# Patient Record
Sex: MALE | Race: WHITE | Employment: OTHER | URBAN - METROPOLITAN AREA
[De-identification: names, ages, dates, MRNs, and addresses within clinical notes are randomized per-mention and may not be internally consistent; named-entity substitution may affect disease eponyms.]

---

## 2018-02-27 PROBLEM — R40.0 DAYTIME SLEEPINESS: Status: ACTIVE | Noted: 2018-02-27

## 2018-02-27 PROBLEM — R06.83 SNORING: Status: ACTIVE | Noted: 2018-02-27

## 2021-12-14 ENCOUNTER — APPOINTMENT (OUTPATIENT)
Dept: ULTRASOUND IMAGING | Age: 66
End: 2021-12-14
Attending: EMERGENCY MEDICINE
Payer: MEDICARE

## 2021-12-14 ENCOUNTER — HOSPITAL ENCOUNTER (OUTPATIENT)
Facility: HOSPITAL | Age: 66
Setting detail: OBSERVATION
Discharge: HOME | End: 2021-12-17
Attending: EMERGENCY MEDICINE | Admitting: HOSPITALIST
Payer: MEDICARE

## 2021-12-14 DIAGNOSIS — L73.9 FOLLICULITIS: Primary | ICD-10-CM

## 2021-12-14 DIAGNOSIS — L03.119 CELLULITIS OF LOWER EXTREMITY, UNSPECIFIED LATERALITY: ICD-10-CM

## 2021-12-14 DIAGNOSIS — R60.0 LEG EDEMA: ICD-10-CM

## 2021-12-14 DIAGNOSIS — I82.419 ACUTE DEEP VEIN THROMBOSIS (DVT) OF FEMORAL VEIN, UNSPECIFIED LATERALITY (HCC): ICD-10-CM

## 2021-12-14 PROCEDURE — 93970 EXTREMITY STUDY: CPT | Performed by: EMERGENCY MEDICINE

## 2021-12-14 PROCEDURE — 99220 INITIAL OBSERVATION CARE,LEVL III: CPT | Performed by: INTERNAL MEDICINE

## 2021-12-14 RX ORDER — CEPHALEXIN 500 MG/1
500 CAPSULE ORAL 4 TIMES DAILY
Qty: 40 CAPSULE | Refills: 0 | Status: SHIPPED | OUTPATIENT
Start: 2021-12-14 | End: 2021-12-17

## 2021-12-14 RX ORDER — HEPARIN SODIUM AND DEXTROSE 10000; 5 [USP'U]/100ML; G/100ML
18 INJECTION INTRAVENOUS ONCE
Status: COMPLETED | OUTPATIENT
Start: 2021-12-14 | End: 2021-12-15

## 2021-12-14 RX ORDER — HEPARIN SODIUM 5000 [USP'U]/ML
80 INJECTION INTRAVENOUS; SUBCUTANEOUS ONCE
Status: COMPLETED | OUTPATIENT
Start: 2021-12-14 | End: 2021-12-14

## 2021-12-15 ENCOUNTER — APPOINTMENT (OUTPATIENT)
Dept: GENERAL RADIOLOGY | Facility: HOSPITAL | Age: 66
End: 2021-12-15
Attending: INTERNAL MEDICINE
Payer: MEDICARE

## 2021-12-15 PROBLEM — I82.419: Status: ACTIVE | Noted: 2021-12-15

## 2021-12-15 PROBLEM — R60.0 LEG EDEMA: Status: ACTIVE | Noted: 2021-12-15

## 2021-12-15 PROBLEM — L03.119 CELLULITIS OF LOWER EXTREMITY, UNSPECIFIED LATERALITY: Status: ACTIVE | Noted: 2021-12-15

## 2021-12-15 PROCEDURE — 99204 OFFICE O/P NEW MOD 45 MIN: CPT | Performed by: INTERNAL MEDICINE

## 2021-12-15 PROCEDURE — 74018 RADEX ABDOMEN 1 VIEW: CPT | Performed by: INTERNAL MEDICINE

## 2021-12-15 RX ORDER — DOCUSATE SODIUM 100 MG/1
100 CAPSULE, LIQUID FILLED ORAL DAILY
Status: DISCONTINUED | OUTPATIENT
Start: 2021-12-15 | End: 2021-12-17

## 2021-12-15 RX ORDER — LISINOPRIL AND HYDROCHLOROTHIAZIDE 12.5; 1 MG/1; MG/1
1 TABLET ORAL DAILY
Status: DISCONTINUED | OUTPATIENT
Start: 2021-12-15 | End: 2021-12-15 | Stop reason: SDUPTHER

## 2021-12-15 RX ORDER — MELATONIN
3 NIGHTLY PRN
Status: DISCONTINUED | OUTPATIENT
Start: 2021-12-15 | End: 2021-12-17

## 2021-12-15 RX ORDER — ACETAMINOPHEN 500 MG
500 TABLET ORAL EVERY 6 HOURS PRN
Status: DISCONTINUED | OUTPATIENT
Start: 2021-12-15 | End: 2021-12-17

## 2021-12-15 RX ORDER — BISACODYL 10 MG
10 SUPPOSITORY, RECTAL RECTAL
Status: DISCONTINUED | OUTPATIENT
Start: 2021-12-15 | End: 2021-12-17

## 2021-12-15 RX ORDER — ACETAMINOPHEN 325 MG/1
650 TABLET ORAL EVERY 6 HOURS PRN
Status: DISCONTINUED | OUTPATIENT
Start: 2021-12-15 | End: 2021-12-17

## 2021-12-15 RX ORDER — FLUTICASONE PROPIONATE 50 MCG
1 SPRAY, SUSPENSION (ML) NASAL DAILY
Status: DISCONTINUED | OUTPATIENT
Start: 2021-12-15 | End: 2021-12-17

## 2021-12-15 RX ORDER — CALCIUM POLYCARBOPHIL 625 MG 625 MG/1
625 TABLET ORAL EVERY OTHER DAY
Status: DISCONTINUED | OUTPATIENT
Start: 2021-12-15 | End: 2021-12-17

## 2021-12-15 RX ORDER — RISPERIDONE 0.25 MG/1
1 TABLET, FILM COATED ORAL 2 TIMES DAILY
Status: DISCONTINUED | OUTPATIENT
Start: 2021-12-15 | End: 2021-12-17

## 2021-12-15 RX ORDER — CETIRIZINE HYDROCHLORIDE 5 MG/1
5 TABLET ORAL DAILY
Status: DISCONTINUED | OUTPATIENT
Start: 2021-12-15 | End: 2021-12-17

## 2021-12-15 RX ORDER — PRAVASTATIN SODIUM 10 MG
10 TABLET ORAL NIGHTLY
Status: DISCONTINUED | OUTPATIENT
Start: 2021-12-15 | End: 2021-12-17

## 2021-12-15 RX ORDER — SENNOSIDES 8.6 MG
17.2 TABLET ORAL NIGHTLY PRN
Status: DISCONTINUED | OUTPATIENT
Start: 2021-12-15 | End: 2021-12-17

## 2021-12-15 RX ORDER — LORAZEPAM 1 MG/1
1 TABLET ORAL DAILY
Status: DISCONTINUED | OUTPATIENT
Start: 2021-12-15 | End: 2021-12-17

## 2021-12-15 RX ORDER — HEPARIN SODIUM AND DEXTROSE 10000; 5 [USP'U]/100ML; G/100ML
INJECTION INTRAVENOUS CONTINUOUS
Status: DISCONTINUED | OUTPATIENT
Start: 2021-12-15 | End: 2021-12-17

## 2021-12-15 RX ORDER — SODIUM PHOSPHATE, DIBASIC AND SODIUM PHOSPHATE, MONOBASIC 7; 19 G/133ML; G/133ML
1 ENEMA RECTAL ONCE AS NEEDED
Status: DISCONTINUED | OUTPATIENT
Start: 2021-12-15 | End: 2021-12-17

## 2021-12-15 RX ORDER — FAMOTIDINE 20 MG/1
20 TABLET ORAL DAILY
Status: DISCONTINUED | OUTPATIENT
Start: 2021-12-15 | End: 2021-12-17

## 2021-12-15 RX ORDER — AMLODIPINE BESYLATE 5 MG/1
5 TABLET ORAL DAILY
Status: DISCONTINUED | OUTPATIENT
Start: 2021-12-15 | End: 2021-12-17

## 2021-12-15 RX ORDER — TAMSULOSIN HYDROCHLORIDE 0.4 MG/1
0.4 CAPSULE ORAL DAILY
Status: DISCONTINUED | OUTPATIENT
Start: 2021-12-15 | End: 2021-12-17

## 2021-12-15 RX ORDER — ONDANSETRON 2 MG/ML
4 INJECTION INTRAMUSCULAR; INTRAVENOUS EVERY 6 HOURS PRN
Status: DISCONTINUED | OUTPATIENT
Start: 2021-12-15 | End: 2021-12-17

## 2021-12-15 RX ORDER — POLYETHYLENE GLYCOL 3350 17 G/17G
17 POWDER, FOR SOLUTION ORAL DAILY PRN
Status: DISCONTINUED | OUTPATIENT
Start: 2021-12-15 | End: 2021-12-17

## 2021-12-15 NOTE — ED QUICK NOTES
Orders for admission, patient is aware of plan and ready to go upstairs. Any questions, please call ED RN Torin Chacon at extension [phone]     Vaccinated? yes  Type of COVID test sent:rapid  COVID Suspicion level: Low      Titratable drug(s) infusing:Heparin  Rate:22

## 2021-12-15 NOTE — CM/SW NOTE
From [facility], MSW spoke to pt's TERENCE Farnsworth at the group home. If pt needs a ride home they would like staff to arrange a medicar. MSW called Pt's guardian Marialuisa-dago stout.

## 2021-12-15 NOTE — ED INITIAL ASSESSMENT (HPI)
Pt from [facility] to ED with staff member. Brought to ED for bilat swelling and redness worse today. Denies fevers, denies injury. Per staff member patient has frequent swelling to bilat lower extremities but redness is main concern.

## 2021-12-15 NOTE — SLP NOTE
ADULT SWALLOWING EVALUATION    ASSESSMENT    ASSESSMENT/OVERALL IMPRESSION:  Order received for bedside swallow valuation to r/o aspiration. Pt presented with acute on chronic BLLE swelling.  Pmhx includes developmental delay, HTN, HLD, psychiatric disorder Liquids                        Compensatory Strategies Recommended: Slow rate; Alternate consistencies;Small bites and sips  Aspiration Precautions: Upright position; Slow rate;Small bites and sips  Medication Administration Recommendations: Whole in puree swallows  Patient Positioning: Upright;Midline (pt lying down in bed; HOB elevated to 90 degrees)    Oral Phase of Swallow: Impaired  Bolus Retrieval: Intact  Bilabial Seal: Intact  Bolus Formation: Impaired  Bolus Propulsion: Impaired  Mastication: Impair

## 2021-12-15 NOTE — CONSULTS
Cancer Center Report of Consultation    Patient Name: Garfield Roberts   YOB: 1955   Medical Record Number: [id number]   CSN: [id number]   Consulting Physician: Kunal Cleveland MD  Referring Physician(s): No ref.  provider found  Date of Consultation: file  Food Insecurity: Not on file  Transportation Needs: Not on file  Physical Activity: Not on file  Stress: Not on file  Social Connections: Not on file  Intimate Partner Violence: Not on file  Housing Stability: Not on file    Allergies:   No Known All CREATSERUM 1.00 12/15/2021     (H) 12/15/2021    CA 9.0 12/15/2021    ALKPHO 94 12/14/2021    ALT 22 12/14/2021    AST 16 12/14/2021    BILT 0.2 12/14/2021    ALB 3.1 (L) 12/14/2021    TP 7.3 12/14/2021         Radiologic imaging reviewed at this

## 2021-12-15 NOTE — ED PROVIDER NOTES
Patient Seen in: THE [facility] In [place]      History   Patient presents with:  Swelling Edema    Stated Complaint: SYLVIA LEG SWELLING/REDNESS    Subjective:   HPI    Patient is a 41-year-old gentleman, development delayed, who presents w Abdomen: Soft and nontender throughout. No rebound or guarding  Extremities: +1-2 pitting edema bilaterally. Patient appears to have a folliculitis on both legs. Mild erythema. Skin: No rashes, no pallor  Neuro: Awake oriented ×3.   Nonfocal.  Good st Normal   PLATELET COUNT - Normal   FERRITIN - Normal   VITAMIN J15 - Normal   FOLIC ACID SERUM(FOLATE) - Normal   LDH - Normal   RAPID SARS-COV-2 BY PCR - Normal   CBC WITH DIFFERENTIAL WITH PLATELET    Narrative:      The following orders were created for capsule (500 mg total) by mouth 4 (four) times daily for 10 days.   Qty: 40 capsule Refills: 0                            Hospital Problems             Present on Admission           ICD-10-CM Noted POA    * (Principal) Folliculitis C67.0 71/14/3069 Unknown

## 2021-12-15 NOTE — H&P
[facility] HOSPITALIST  History and Physical     Yolanda Sheldon Patient Status:  Emergency    1955 MRN [id number]   Location [facility] Attending Kyle Escobar MD   Hosp Day # 0 PCP WENDY SALAZAR     Chief Complaint: acut loratadine 10 MG Oral Tab, Take 10 mg by mouth daily. , Disp: , Rfl:   Sertraline HCl 50 MG Oral Tab, Take 50 mg by mouth daily. , Disp: , Rfl:   docusate sodium (DOCQLACE) 100 MG Oral Cap, Take 100 mg by mouth daily. , Disp: , Rfl:   famoTIDine 20 MG Oral auscultation bilaterally. No wheezes. No rhonchi. Cardiovascular: S1, S2. Regular rate and rhythm. No murmurs, rubs or gallops. Equal pulses. Chest and Back: No tenderness or deformity.   Abdomen: Soft, mild TTP central region, +distended, some tympany t anemia - unclear chronicity    #BPH  - tamsulosin    Quality:  · DVT Prophylaxis: lovenox  · CODE status: FULL  · [place]: no  · If COVID testing is negative, may discontinue isolation: yes     Plan of care discussed with pt, AJ Bennett Current, DO  1

## 2021-12-15 NOTE — PLAN OF CARE
Assumed care at 0730. A&OX3-4 - Developmentally delayed. Pleasant and cooperative with care. Tele-SR. Seizure precautions maintained. Edema BLE, Rt leg larger than Lt. Maintained BR except to go to BR. Aspiration precautions.   Pt choked on eggs this am. Encourage food from home; allow for food preferences  - Enhance eating environment  Outcome: Progressing     Problem: METABOLIC/FLUID AND ELECTROLYTES - ADULT  Goal: Electrolytes maintained within normal limits  Description: INTERVENTIONS:  - Monitor labs Ensure adequate protection for wounds/incisions during mobilization  - Obtain PT/OT consults as needed  - Advance activity as appropriate  - Communicate ordered activity level and limitations with patient/family  Outcome: Progressing     Problem: Giovanni Mcgee or patient reports new pain  - Anticipate increased pain with activity and pre-medicate as appropriate  Outcome: Progressing     Problem: SAFETY ADULT - FALL  Goal: Free from fall injury  Description: INTERVENTIONS:  - Assess pt frequently for physical nee

## 2021-12-15 NOTE — PROGRESS NOTES
[facility]     Hospitalist Progress Note     Bere Figueroa Patient Status:  Observation    1955 MRN [id number]   [facility] 3NE-A Attending Omi Apple MD   Hosp Day # 0 PCP WENDY SALAZAR     Chief Complaint: LEs edema    Subjective: Epic.    Medications:   • amLODIPine  5 mg Oral Daily   • docusate sodium  100 mg Oral Daily   • famotidine  20 mg Oral Daily   • fluticasone propionate  1 spray Each Nare Daily   • cetirizine  5 mg Oral Daily   • LORazepam  1 mg Oral Daily   • polycarboph

## 2021-12-15 NOTE — ED PROVIDER NOTES
Ultrasound did show extensive clot within the leg. Pretty much the entire leg is clotted. Patient was started on heparin and be admitted due to the extensive nature of the clot.   Discussed case with hospitalist

## 2021-12-16 PROCEDURE — 99225 SUBSEQUENT OBSERVATION CARE: CPT | Performed by: HOSPITALIST

## 2021-12-16 PROCEDURE — 99213 OFFICE O/P EST LOW 20 MIN: CPT | Performed by: INTERNAL MEDICINE

## 2021-12-16 NOTE — CM/SW NOTE
MSW updated RN that pt needs meds called or faxed to [id number] [facility]  who will deviler pt's meds to his home.   P: [phone]  Fax: [phone]

## 2021-12-16 NOTE — PROGRESS NOTES
Heme/Onc Progress Note - [facility]      Chief Complaint:    Follow up for evaluation and management of right leg DVT. Interim History:      The patient has no new complaints. He has no chest pain or dyspnea.      Physical Examination:    Vital Signs: /6 evidence of DVT within the left lower extremity. 3. Findings of this critical value study were discussed with Dr. Swetha Randhawa on 12/14/2021 at 2210 hours.  Read back was performed.         Impression:     Right lower extremity femoral vein DVT  Involves common a

## 2021-12-16 NOTE — PLAN OF CARE
Pt is A&Ox2-3. Aspiration precautions, meds whole in applesauce tolerated well. RA, lungs clear. NSR on tele HR 70s. Denies cardiovascular symptoms. Heparin gtt infusing per DVT protocol. Primofit placed for urine collection. BLE edema R > L. Up x1 walker. maintained within normal limits  Description: INTERVENTIONS:  - Monitor labs and rhythm and assess patient for signs and symptoms of electrolyte imbalances  - Administer electrolyte replacement as ordered  - Monitor response to electrolyte replacements, in limitations with patient/family  Outcome: Progressing     Problem: NEUROLOGICAL - ADULT  Goal: Absence of seizures  Description: INTERVENTIONS  - Monitor for seizure activity  - Administer anti-seizure medications as ordered  - Monitor neurological status injury  Description: INTERVENTIONS:  - Assess pt frequently for physical needs  - Identify cognitive and physical deficits and behaviors that affect risk of falls.   - [place] fall precautions as indicated by assessment.  - Educate pt/family on patient sa

## 2021-12-16 NOTE — PROGRESS NOTES
12/16/21 0130 12/16/21 0131 12/16/21 0132   Oxygen Therapy   SpO2 (!) 88 % (!) 86 % (!) 85 %   O2 Device None (Room air) None (Room air) None (Room air)      12/16/21 0133 12/16/21 0134   Oxygen Therapy   SpO2 (!) 86 % (!) 88 %   O2 Device None (Room ai

## 2021-12-16 NOTE — PLAN OF CARE
At dinner pt eating food to quickly and choking and coughing.   Changed diet to Pureed until Speech can reevaluate him in the am.

## 2021-12-16 NOTE — SLP NOTE
SPEECH DAILY NOTE - INPATIENT    ASSESSMENT & PLAN   ASSESSMENT  Pt seen for dysphagia tx to assess tolerance with recommended diet, ensure appropriate utilization of aspiration precautions and provide pt/family education.  Chart review revealed pt observed Required): Yes  SLP Follow-up Date: 12/17/21  Number of Visits to Meet Established Goals: 2    Session: 1    If you have any questions, please contact CANDI Kaba    SLP donned eyewear, face mask and gloves prior to working with patient.  Pt unable to w

## 2021-12-17 ENCOUNTER — APPOINTMENT (OUTPATIENT)
Dept: GENERAL RADIOLOGY | Facility: HOSPITAL | Age: 66
End: 2021-12-17
Attending: HOSPITALIST
Payer: MEDICARE

## 2021-12-17 VITALS
HEART RATE: 77 BPM | WEIGHT: 270 LBS | RESPIRATION RATE: 18 BRPM | TEMPERATURE: 98 F | DIASTOLIC BLOOD PRESSURE: 61 MMHG | OXYGEN SATURATION: 92 % | BODY MASS INDEX: 43.39 KG/M2 | HEIGHT: 66 IN | SYSTOLIC BLOOD PRESSURE: 118 MMHG

## 2021-12-17 PROCEDURE — 74230 X-RAY XM SWLNG FUNCJ C+: CPT | Performed by: HOSPITALIST

## 2021-12-17 PROCEDURE — 99217 OBSERVATION CARE DISCHARGE: CPT | Performed by: HOSPITALIST

## 2021-12-17 PROCEDURE — 99203 OFFICE O/P NEW LOW 30 MIN: CPT | Performed by: NURSE PRACTITIONER

## 2021-12-17 RX ORDER — CEPHALEXIN 500 MG/1
500 CAPSULE ORAL 4 TIMES DAILY
Qty: 40 CAPSULE | Refills: 0 | Status: SHIPPED | OUTPATIENT
Start: 2021-12-17 | End: 2022-01-05

## 2021-12-17 NOTE — PLAN OF CARE
Assumed care at 0700, A/Ox2-3. 2LNC when sleeping. R/A at times when awake. NSR on tele. Meds whole in sauce. Pureed diet w/thins.  Medically cleared for discharge, but script for xarelto could not be obtained by his home prescription agency until tomorrow staff  - Avoid use of toothpicks and dental floss  - Use electric shaver for shaving  - Use soft bristle tooth brush  - Limit straining and forceful nose blowing  Outcome: Progressing     Problem: NEUROLOGICAL - ADULT  Goal: Absence of seizures  Descriptio

## 2021-12-17 NOTE — PROGRESS NOTES
Hem/Onc Inpatient Note    Patient Name: Cady Huynh   YOB: 1955   Medical Record Number: [id number]   CSN: [id number]   Attending Hematology/Oncology Physician: Dr. Lynda Mcgrath    S: patient currently denies complaints. Started rivaroxaban yesterday. Radiology:  XR VIDEO SWALLOW (WFF=[id number])    Result Date: 12/17/2021  PROCEDURE:  XR VIDEO SWALLOW (CPT=74230)  TECHNIQUE:  Video fluoroscopic swallowing study was performed in cooperation with the speech pathologist.  Barium of varying consistencies

## 2021-12-17 NOTE — CM/SW NOTE
DC PLAN:  Breana Adame at 2pm  Call report to [address] [place] [address] [address] [phone]  Guardian updated by MSW via VM per her office she is off today  MSW updated RN Kim Sultana on dc time. PCS [facility]ed by [facility]W [facility].

## 2021-12-17 NOTE — PLAN OF CARE
Pt. A&Ox2  RA;VSS  Tele- NSR  Primofit in place  Video swallow completed, nectar thick liquids and regular chopped diet approved  To go back to group home today on PO abx and xarelto  Staff will continue to monitor       Problem: Patient/Family Goals  Goal and symptoms of electrolyte imbalances  - Administer electrolyte replacement as ordered  - Monitor response to electrolyte replacements, including rhythm and repeat lab results as appropriate  - Fluid restriction as ordered  - Instruct patient on fluid and seizures  Description: INTERVENTIONS  - Monitor for seizure activity  - Administer anti-seizure medications as ordered  - Monitor neurological status  Outcome: Progressing     Problem: Impaired Functional Mobility  Goal: Achieve highest/safest level of mob INTERVENTIONS:  - Assess pt frequently for physical needs  - Identify cognitive and physical deficits and behaviors that affect risk of falls.   - [place] fall precautions as indicated by assessment.  - Educate pt/family on patient safety including physic

## 2021-12-17 NOTE — PLAN OF CARE
NURSING DISCHARGE NOTE    Discharged Other, (see nursing note) via Ambulance.   Accompanied by Support staff  Belongings taken by patient  Prescriptions given to ambulance staff AND ensure by RN that [facility] pharmacy had both medications for pt  Repor

## 2021-12-17 NOTE — SLP NOTE
ADULT VIDEOFLUOROSCOPIC SWALLOWING STUDY    Admission Date: 12/14/2021  Evaluation Date: 12/17/21  Radiologist: Dr Babar Clark   Diet Recommendations - Solids: Mechanical soft chopped/ Soft & Bite Sized  Diet Recommendations - Liquids: Nectar th Upright;Midline. Patient Viewed: Lateral.  Patient Alertness: Fully alert. Consistencies Presented: Thin liquids; Nectar thick liquids/ Mildly thick;Puree; Soft solid to assess oropharyngeal swallow function and assess for compensatory strategies to improv Clear Response: No     PUREE  Oral Phase of Swallow (VFSS - Puree):  Within Functional Limits  Triggered at: Valleculae  Delay (seconds): minimal  Residue Severity, Location: Mild;Valleculae;Pyriform sinuses  Cleared/Reduced with: Secondary swallow  Larynge solids. Reduced base of tongue and hyolaryngeal elevation resulted in mild pharyngeal residue following swallow response with all consistencies. Pt mostly cleared residue utilizing volitional double swallow.     Discussed results of exam and recommendations

## 2021-12-17 NOTE — PLAN OF CARE
Assumed care at 1. Pt is A&O x2-3. ON RA but wears 2L of 02 while asleep. NSR on tele. Received xarelto dose last night per MAR. Primofit in place. Incontinent and briefed. Plan is to discharge tomorrow after video swallow by speech.  Takes  meds whole

## 2022-01-05 ENCOUNTER — OFFICE VISIT (OUTPATIENT)
Dept: HEMATOLOGY/ONCOLOGY | Facility: HOSPITAL | Age: 67
End: 2022-01-05
Attending: INTERNAL MEDICINE
Payer: MEDICARE

## 2022-01-05 VITALS
OXYGEN SATURATION: 96 % | HEART RATE: 84 BPM | DIASTOLIC BLOOD PRESSURE: 71 MMHG | SYSTOLIC BLOOD PRESSURE: 115 MMHG | RESPIRATION RATE: 20 BRPM | TEMPERATURE: 98 F

## 2022-01-05 DIAGNOSIS — I82.419 ACUTE DEEP VEIN THROMBOSIS (DVT) OF FEMORAL VEIN, UNSPECIFIED LATERALITY (HCC): Primary | ICD-10-CM

## 2022-01-05 DIAGNOSIS — D64.9 NORMOCYTIC ANEMIA: ICD-10-CM

## 2022-01-05 PROCEDURE — 99214 OFFICE O/P EST MOD 30 MIN: CPT | Performed by: INTERNAL MEDICINE

## 2022-01-05 NOTE — PROGRESS NOTES
Cancer Center Progress Note    Problem List:      Patient Active Problem List:     Pure hypercholesterolemia     Essential (primary) hypertension     Mitral valve disorder     Snoring     Daytime sleepiness     Folliculitis     Leg edema     Cellulitis of disorder    • [facility]otic disorder ([facility]    • Seizure disorder ([facility]    • Stroke ([facility]     Cpap use   • Urinary incontinence        History reviewed. No pertinent surgical history. Family History Reviewed:  Family History   Family history unknown:  Yes occlusive and completely occlusive DVT/thrombus involving the common femoral vein, mid to distal superficial femoral vein, the popliteal vein and the posterior tibial vein. No evidence of DVT within the left lower extremity.    COMPRESSION:  The lack of nor

## 2022-01-05 NOTE — DISCHARGE SUMMARY
[facility] HOSPITALIST  DISCHARGE SUMMARY     Giancarlo Mcrgaw Patient Status:  Observation    1955 MRN [id number]   [facility] 3NE-A Attending No att. providers found   [address] Day # 0 PCP WENDY SALAZAR     Date of Admission: 2021  Date of Jyoti Garner Tabs  Commonly known as: PEPCID      Take 20 mg by mouth daily. Refills: 0     fluticasone propionate 50 MCG/ACT Susp  Commonly known as: FLONASE      1 spray by Each Nare route daily. Refills: 0     GAVILAX OR      Take by mouth daily.    Refills: 0  your prescriptions at the location directed by your doctor or nurse    Bring a paper prescription for each of these medications  · cephalexin 500 MG Caps  · rivaroxaban 15 & 20 MG Tbpk         [place]      Follow-up appointment:   Aishwarya Montenegro Respiratory: Clear to auscultation bilaterally. No wheezes. No rhonchi. Cardiovascular: S1, S2. Regular rate and rhythm. No murmurs, rubs or gallops. Abdomen: Soft, nontender, nondistended. Positive bowel sounds. No rebound or guarding.   Neurologic:

## 2022-01-05 NOTE — PROGRESS NOTES
Patient is here with caregiver for hospital follow up for DVT. He reports that his leg is feeling better. He denies pain. He is here in a wheelchair but states that he can walk on it. He has been taking xarelto without any unusual bleeding.   They can n

## 2022-03-08 ENCOUNTER — TELEPHONE (OUTPATIENT)
Dept: HEMATOLOGY/ONCOLOGY | Facility: HOSPITAL | Age: 67
End: 2022-03-08

## 2022-03-08 NOTE — TELEPHONE ENCOUNTER
Called again and spoke with representative Edgar Del Toro, who reports that Taye Zheng was given the message to return Pau's call about the swelling for  this patient. Await return call.

## 2022-03-08 NOTE — TELEPHONE ENCOUNTER
Kaelyn Mosley Manager at [phone] is calling because the patient still have swelling in both legs. Dr. Charanjit Adair pt.

## 2022-03-09 NOTE — TELEPHONE ENCOUNTER
I called again to speak with Karishma Damico about this patient. The person answering the phone took another message and will ask Karishma Damico to call back. Phone number was provided.

## 2022-03-10 NOTE — TELEPHONE ENCOUNTER
Dr. Lucas Mera notified of patient status. He instructs that patient should continue xarelto. He can take tylenol for discomfort. Explained that the swelling can persist for a while after the clot. He can elevate  his leg when possible to help with swelling. They should watch for increasing redness, pain or swelling. Since the PCP saw him and evaluated the swelling no follow up here is necessary now unless he develops any of the other listed problems. Instructed to call with any of these or with any questions.

## 2022-03-10 NOTE — TELEPHONE ENCOUNTER
Zachary Zamarripa called back. Ten Phipps continues to have swelling of right leg where the clot was. He is taking xarelto 20mg without any missed doses. He does have some pain in this leg when it is examined. He complains of some pain when walking. He saw his PCP yesterday who thought the swelling was within expected parameters. He stopped ibruprofen that patient was taking PRN due to being on the xarelto. Zachary Zamarripa wants to know if patient needs an exam or any other testing. Will discuss with Dr. Darling Lee and call her back.

## 2022-05-03 RX ORDER — RIVAROXABAN 20 MG/1
TABLET, FILM COATED ORAL
Qty: 30 TABLET | Refills: 1 | Status: SHIPPED | OUTPATIENT
Start: 2022-05-03

## 2022-06-09 ENCOUNTER — TELEPHONE (OUTPATIENT)
Dept: HEMATOLOGY/ONCOLOGY | Facility: HOSPITAL | Age: 67
End: 2022-06-09

## 2022-06-09 NOTE — TELEPHONE ENCOUNTER
Dr. Charanjit Adair instructs that patient be evaluated either by his PCP or an acute care visit at the [facility]. Patient could also schedule an office visit sooner with Dr. Charanjit Adair. Called and Nithya Iglesias is unavailable. Will call her tomorrow.

## 2022-06-09 NOTE — TELEPHONE ENCOUNTER
I spoke with Britni Smtih. The patient's swelling is about the same as it was 2 months ago. The patient is complaining more about pain in his legs and feet with more discomfort in the right leg vs the left leg. He has been taking his xarelto 20mg daily. He has red spots on only his right leg that are not itchy and do not appear like a rash. He is not taking any kind of diuretics according to Britni Smith. Will inform Dr. Annalisa Velazquez and call Britni Smith back with instructions.

## 2022-06-09 NOTE — TELEPHONE ENCOUNTER
[facility] Solis [facility] states that the patient has swelling in his legs and he has red spots on his right leg. He complains of pain in his legs and his feet. He did have a blood clot last year in his legs. Please call Eber Solis at  [phone]. Thank you.

## 2022-06-10 ENCOUNTER — APPOINTMENT (OUTPATIENT)
Dept: ULTRASOUND IMAGING | Age: 67
End: 2022-06-10
Attending: EMERGENCY MEDICINE
Payer: MEDICARE

## 2022-06-10 ENCOUNTER — APPOINTMENT (OUTPATIENT)
Dept: GENERAL RADIOLOGY | Age: 67
End: 2022-06-10
Attending: EMERGENCY MEDICINE
Payer: MEDICARE

## 2022-06-10 ENCOUNTER — HOSPITAL ENCOUNTER (OUTPATIENT)
Facility: HOSPITAL | Age: 67
Setting detail: OBSERVATION
Discharge: HOME | End: 2022-06-12
Attending: EMERGENCY MEDICINE | Admitting: INTERNAL MEDICINE
Payer: MEDICARE

## 2022-06-10 ENCOUNTER — TELEPHONE (OUTPATIENT)
Dept: HEMATOLOGY/ONCOLOGY | Facility: HOSPITAL | Age: 67
End: 2022-06-10

## 2022-06-10 DIAGNOSIS — R06.00 DYSPNEA, UNSPECIFIED TYPE: Primary | ICD-10-CM

## 2022-06-10 DIAGNOSIS — I50.9 ACUTE ON CHRONIC CONGESTIVE HEART FAILURE, UNSPECIFIED HEART FAILURE TYPE (HCC): ICD-10-CM

## 2022-06-10 LAB
ALBUMIN SERPL-MCNC: 3.4 G/DL (ref 3.4–5)
ALBUMIN/GLOB SERPL: 0.8 {RATIO} (ref 1–2)
ALP LIVER SERPL-CCNC: 84 U/L
ALT SERPL-CCNC: 34 U/L
ANION GAP SERPL CALC-SCNC: 4 MMOL/L (ref 0–18)
APTT PPP: 34.4 SECONDS (ref 23.3–35.6)
AST SERPL-CCNC: 23 U/L (ref 15–37)
ATRIAL RATE: 72 BPM
BASOPHILS # BLD AUTO: 0.05 X10(3) UL (ref 0–0.2)
BASOPHILS NFR BLD AUTO: 0.7 %
BILIRUB SERPL-MCNC: 0.2 MG/DL (ref 0.1–2)
BUN BLD-MCNC: 10 MG/DL (ref 7–18)
CALCIUM BLD-MCNC: 8.6 MG/DL (ref 8.5–10.1)
CHLORIDE SERPL-SCNC: 106 MMOL/L (ref 98–112)
CO2 SERPL-SCNC: 27 MMOL/L (ref 21–32)
CREAT BLD-MCNC: 1.1 MG/DL
EOSINOPHIL # BLD AUTO: 0.11 X10(3) UL (ref 0–0.7)
EOSINOPHIL NFR BLD AUTO: 1.5 %
ERYTHROCYTE [DISTWIDTH] IN BLOOD BY AUTOMATED COUNT: 13 %
GLOBULIN PLAS-MCNC: 4.1 G/DL (ref 2.8–4.4)
GLUCOSE BLD-MCNC: 80 MG/DL (ref 70–99)
GLUCOSE BLD-MCNC: 85 MG/DL (ref 70–99)
HCT VFR BLD AUTO: 37.2 %
HGB BLD-MCNC: 11.7 G/DL
IMM GRANULOCYTES # BLD AUTO: 0.02 X10(3) UL (ref 0–1)
IMM GRANULOCYTES NFR BLD: 0.3 %
INR BLD: 1.13 (ref 0.8–1.2)
LYMPHOCYTES # BLD AUTO: 2.47 X10(3) UL (ref 1–4)
LYMPHOCYTES NFR BLD AUTO: 33.1 %
MCH RBC QN AUTO: 26.6 PG (ref 26–34)
MCHC RBC AUTO-ENTMCNC: 31.5 G/DL (ref 31–37)
MCV RBC AUTO: 84.5 FL
MONOCYTES # BLD AUTO: 0.79 X10(3) UL (ref 0.1–1)
MONOCYTES NFR BLD AUTO: 10.6 %
NEUTROPHILS # BLD AUTO: 4.02 X10 (3) UL (ref 1.5–7.7)
NEUTROPHILS # BLD AUTO: 4.02 X10(3) UL (ref 1.5–7.7)
NEUTROPHILS NFR BLD AUTO: 53.8 %
NT-PROBNP SERPL-MCNC: 210 PG/ML (ref ?–125)
OSMOLALITY SERPL CALC.SUM OF ELEC: 282 MOSM/KG (ref 275–295)
P AXIS: 33 DEGREES
P-R INTERVAL: 164 MS
PLATELET # BLD AUTO: 240 10(3)UL (ref 150–450)
POTASSIUM SERPL-SCNC: 4.2 MMOL/L (ref 3.5–5.1)
PROT SERPL-MCNC: 7.5 G/DL (ref 6.4–8.2)
PROTHROMBIN TIME: 14.3 SECONDS (ref 11.6–14.8)
Q-T INTERVAL: 374 MS
QRS DURATION: 86 MS
QTC CALCULATION (BEZET): 409 MS
R AXIS: -23 DEGREES
RBC # BLD AUTO: 4.4 X10(6)UL
SARS-COV-2 RNA RESP QL NAA+PROBE: NOT DETECTED
SODIUM SERPL-SCNC: 137 MMOL/L (ref 136–145)
T AXIS: 5 DEGREES
TROPONIN I HIGH SENSITIVITY: 6 NG/L
VENTRICULAR RATE: 72 BPM
WBC # BLD AUTO: 7.5 X10(3) UL (ref 4–11)

## 2022-06-10 PROCEDURE — 71045 X-RAY EXAM CHEST 1 VIEW: CPT | Performed by: EMERGENCY MEDICINE

## 2022-06-10 PROCEDURE — 93970 EXTREMITY STUDY: CPT | Performed by: EMERGENCY MEDICINE

## 2022-06-10 PROCEDURE — 99220 INITIAL OBSERVATION CARE,LEVL III: CPT | Performed by: HOSPITALIST

## 2022-06-10 RX ORDER — ACETAMINOPHEN 500 MG
500 TABLET ORAL EVERY 4 HOURS PRN
Status: DISCONTINUED | OUTPATIENT
Start: 2022-06-10 | End: 2022-06-12

## 2022-06-10 RX ORDER — FAMOTIDINE 20 MG/1
20 TABLET, FILM COATED ORAL DAILY
Status: DISCONTINUED | OUTPATIENT
Start: 2022-06-11 | End: 2022-06-12

## 2022-06-10 RX ORDER — ATORVASTATIN CALCIUM 10 MG/1
10 TABLET, FILM COATED ORAL NIGHTLY
Status: DISCONTINUED | OUTPATIENT
Start: 2022-06-10 | End: 2022-06-12

## 2022-06-10 RX ORDER — BENZTROPINE MESYLATE 0.5 MG/1
0.5 TABLET ORAL 2 TIMES DAILY
Status: DISCONTINUED | OUTPATIENT
Start: 2022-06-10 | End: 2022-06-12

## 2022-06-10 RX ORDER — RISPERIDONE 0.5 MG/1
1 TABLET, FILM COATED ORAL 2 TIMES DAILY
Status: DISCONTINUED | OUTPATIENT
Start: 2022-06-10 | End: 2022-06-12

## 2022-06-10 RX ORDER — FUROSEMIDE 10 MG/ML
20 INJECTION INTRAMUSCULAR; INTRAVENOUS
Status: DISCONTINUED | OUTPATIENT
Start: 2022-06-10 | End: 2022-06-10

## 2022-06-10 RX ORDER — AMLODIPINE BESYLATE 5 MG/1
5 TABLET ORAL DAILY
Status: DISCONTINUED | OUTPATIENT
Start: 2022-06-11 | End: 2022-06-11

## 2022-06-10 RX ORDER — DOCUSATE SODIUM 100 MG/1
100 CAPSULE, LIQUID FILLED ORAL DAILY
Status: DISCONTINUED | OUTPATIENT
Start: 2022-06-11 | End: 2022-06-12

## 2022-06-10 RX ORDER — LORAZEPAM 1 MG/1
1 TABLET ORAL 2 TIMES DAILY
Status: DISCONTINUED | OUTPATIENT
Start: 2022-06-10 | End: 2022-06-12

## 2022-06-10 RX ORDER — ONDANSETRON 2 MG/ML
4 INJECTION INTRAMUSCULAR; INTRAVENOUS EVERY 6 HOURS PRN
Status: DISCONTINUED | OUTPATIENT
Start: 2022-06-10 | End: 2022-06-12

## 2022-06-10 RX ORDER — CALCIUM POLYCARBOPHIL 625 MG 625 MG/1
625 TABLET ORAL EVERY OTHER DAY
Status: DISCONTINUED | OUTPATIENT
Start: 2022-06-12 | End: 2022-06-12

## 2022-06-10 RX ORDER — TAMSULOSIN HYDROCHLORIDE 0.4 MG/1
0.4 CAPSULE ORAL DAILY
Status: DISCONTINUED | OUTPATIENT
Start: 2022-06-11 | End: 2022-06-12

## 2022-06-10 RX ORDER — FUROSEMIDE 10 MG/ML
40 INJECTION INTRAMUSCULAR; INTRAVENOUS
Status: DISCONTINUED | OUTPATIENT
Start: 2022-06-11 | End: 2022-06-11

## 2022-06-10 NOTE — ED INITIAL ASSESSMENT (HPI)
Caregiver states had blood clot in right lower leg in December states leg is now more swollen and painful last several weeks. Denies eros when asked. lives in group home

## 2022-06-10 NOTE — PLAN OF CARE
Pt Aox2-3; anxious, but cooperative. No pain. RA; SOB after ambulating. Swelling +2-3 in SYLVIA extremities. [facility] cardiology called. Group home called for admission questions. Skin checked with JOVANNY Joaquin. Echo ordered. Fall within the year; bed alarm on. Updated with plan of care. Problem: Patient/Family Goals  Goal: Patient/Family Long Term Goal  Description: Patient's Long Term Goal: go back home    Interventions:  - diuresis   - See additional Care Plan goals for specific interventions  Outcome: Progressing  Goal: Patient/Family Short Term Goal  Description: Patient's Short Term Goal: no pain in right leg    Interventions:   - manage pain appropriately  - See additional Care Plan goals for specific interventions  Outcome: Progressing     Problem: CARDIOVASCULAR - ADULT  Goal: Maintains optimal cardiac output and hemodynamic stability  Description: INTERVENTIONS:  - Monitor vital signs, rhythm, and trends  - Monitor for bleeding, hypotension and signs of decreased cardiac output  - Evaluate effectiveness of vasoactive medications to optimize hemodynamic stability  - Monitor arterial and/or venous puncture sites for bleeding and/or hematoma  - Assess quality of pulses, skin color and temperature  - Assess for signs of decreased coronary artery perfusion - ex.  Angina  - Evaluate fluid balance, assess for edema, trend weights  Outcome: Progressing  Goal: Absence of cardiac arrhythmias or at baseline  Description: INTERVENTIONS:  - Continuous cardiac monitoring, monitor vital signs, obtain 12 lead EKG if indicated  - Evaluate effectiveness of antiarrhythmic and heart rate control medications as ordered  - Initiate emergency measures for life threatening arrhythmias  - Monitor electrolytes and administer replacement therapy as ordered  Outcome: Progressing     Problem: SAFETY ADULT - FALL  Goal: Free from fall injury  Description: INTERVENTIONS:  - Assess pt frequently for physical needs  - Identify cognitive and physical deficits and behaviors that affect risk of falls.   - [place] fall precautions as indicated by assessment.  - Educate pt/family on patient safety including physical limitations  - Instruct pt to call for assistance with activity based on assessment  - Modify environment to reduce risk of injury  - Provide assistive devices as appropriate  - Consider OT/PT consult to assist with strengthening/mobility  - Encourage toileting schedule  Outcome: Progressing

## 2022-06-10 NOTE — ED QUICK NOTES
Orders for admission, patient is aware of plan and ready to go upstairs. Any questions, please call ED RN Ella Zuniga  at extension [phone]     Vaccinated? yes  Type of COVID test sent:neg  COVID Suspicion level: Low/High      Titratable drug(s) infusing:  Rate:none    LOC at time of transport:Alert    Other pertinent information:Pt lives in group home, alert times 3    CIWA score=na  NIH score=na

## 2022-06-10 NOTE — TELEPHONE ENCOUNTER
I called Nithya Iglesias back regarding this patient. They chose to send patient to THE [facility] and he is on his way there now. Will inform Dr. Charanjit Adair.

## 2022-06-10 NOTE — TELEPHONE ENCOUNTER
Pauline Gammon called from [facility]. Patient legs and feet and legs hurt. He has red spots on his feet and legs. They are swollen too. Please call. Thank you.

## 2022-06-10 NOTE — TELEPHONE ENCOUNTER
Message left with staff that patient needs to be evaluated by PCP for an ACV in the [facility](per Dr Eric Norman not from yesterday)p, message will be forwarded to  who is not available at this time.

## 2022-06-11 ENCOUNTER — APPOINTMENT (OUTPATIENT)
Dept: CV DIAGNOSTICS | Facility: HOSPITAL | Age: 67
End: 2022-06-11
Attending: HOSPITALIST
Payer: MEDICARE

## 2022-06-11 LAB
ANION GAP SERPL CALC-SCNC: 3 MMOL/L (ref 0–18)
BUN BLD-MCNC: 12 MG/DL (ref 7–18)
CALCIUM BLD-MCNC: 8.6 MG/DL (ref 8.5–10.1)
CHLORIDE SERPL-SCNC: 108 MMOL/L (ref 98–112)
CO2 SERPL-SCNC: 30 MMOL/L (ref 21–32)
CREAT BLD-MCNC: 1.22 MG/DL
GLUCOSE BLD-MCNC: 87 MG/DL (ref 70–99)
MAGNESIUM SERPL-MCNC: 2.3 MG/DL (ref 1.6–2.6)
OSMOLALITY SERPL CALC.SUM OF ELEC: 291 MOSM/KG (ref 275–295)
POTASSIUM SERPL-SCNC: 4.5 MMOL/L (ref 3.5–5.1)
SODIUM SERPL-SCNC: 141 MMOL/L (ref 136–145)

## 2022-06-11 PROCEDURE — 99226 SUBSEQUENT OBSERVATION CARE: CPT | Performed by: HOSPITALIST

## 2022-06-11 PROCEDURE — 93306 TTE W/DOPPLER COMPLETE: CPT | Performed by: HOSPITALIST

## 2022-06-11 RX ORDER — FUROSEMIDE 20 MG/1
20 TABLET ORAL DAILY
Status: DISCONTINUED | OUTPATIENT
Start: 2022-06-11 | End: 2022-06-12

## 2022-06-11 RX ORDER — FUROSEMIDE 10 MG/ML
20 INJECTION INTRAMUSCULAR; INTRAVENOUS DAILY
Status: DISCONTINUED | OUTPATIENT
Start: 2022-06-11 | End: 2022-06-11

## 2022-06-11 RX ORDER — LISINOPRIL 20 MG/1
20 TABLET ORAL DAILY
Status: DISCONTINUED | OUTPATIENT
Start: 2022-06-11 | End: 2022-06-12

## 2022-06-11 NOTE — PLAN OF CARE
RN SHIFT NOTE    Assumed care of pt at 0700. Pt denies pain at this time. Patient is alert and oriented x 2-3 (Reminders and reorientation completed). Patient is NSR on tele, S1 and S2 present and pt denies cardiac symptoms. Lung sounds diminished. Right leg edema +3 and Left leg edema +2 noted. Abdomen soft and round. Last BM on 6/10. Tolerating medications and care needs have been met. POC: 2D echo, Npo till speech see's patient. Problem: CARDIOVASCULAR - ADULT  Goal: Maintains optimal cardiac output and hemodynamic stability  Description: INTERVENTIONS:  - Monitor vital signs, rhythm, and trends  - Monitor for bleeding, hypotension and signs of decreased cardiac output  - Evaluate effectiveness of vasoactive medications to optimize hemodynamic stability  - Monitor arterial and/or venous puncture sites for bleeding and/or hematoma  - Assess quality of pulses, skin color and temperature  - Assess for signs of decreased coronary artery perfusion - ex.  Angina  - Evaluate fluid balance, assess for edema, trend weights  Outcome: Progressing  Goal: Absence of cardiac arrhythmias or at baseline  Description: INTERVENTIONS:  - Continuous cardiac monitoring, monitor vital signs, obtain 12 lead EKG if indicated  - Evaluate effectiveness of antiarrhythmic and heart rate control medications as ordered  - Initiate emergency measures for life threatening arrhythmias  - Monitor electrolytes and administer replacement therapy as ordered  Outcome: Progressing     Problem: Patient/Family Goals  Goal: Patient/Family Long Term Goal  Description: Patient's Long Term Goal: go back home    Interventions:  - diuresis   - See additional Care Plan goals for specific interventions  Outcome: Progressing  Goal: Patient/Family Short Term Goal  Description: Patient's Short Term Goal: no pain in right leg    Interventions:   - manage pain appropriately  - See additional Care Plan goals for specific interventions  Outcome: Progressing     Problem: SAFETY ADULT - FALL  Goal: Free from fall injury  Description: INTERVENTIONS:  - Assess pt frequently for physical needs  - Identify cognitive and physical deficits and behaviors that affect risk of falls. - [place] fall precautions as indicated by assessment.  - Educate pt/family on patient safety including physical limitations  - Instruct pt to call for assistance with activity based on assessment  - Modify environment to reduce risk of injury  - Provide assistive devices as appropriate  - Consider OT/PT consult to assist with strengthening/mobility  - Encourage toileting schedule  Outcome: Progressing     Problem: CARDIOVASCULAR - ADULT  Goal: Maintains optimal cardiac output and hemodynamic stability  Description: INTERVENTIONS:  - Monitor vital signs, rhythm, and trends  - Monitor for bleeding, hypotension and signs of decreased cardiac output  - Evaluate effectiveness of vasoactive medications to optimize hemodynamic stability  - Monitor arterial and/or venous puncture sites for bleeding and/or hematoma  - Assess quality of pulses, skin color and temperature  - Assess for signs of decreased coronary artery perfusion - ex.  Angina  - Evaluate fluid balance, assess for edema, trend weights  Outcome: Progressing  Goal: Absence of cardiac arrhythmias or at baseline  Description: INTERVENTIONS:  - Continuous cardiac monitoring, monitor vital signs, obtain 12 lead EKG if indicated  - Evaluate effectiveness of antiarrhythmic and heart rate control medications as ordered  - Initiate emergency measures for life threatening arrhythmias  - Monitor electrolytes and administer replacement therapy as ordered  Outcome: Progressing     Problem: Patient/Family Goals  Goal: Patient/Family Long Term Goal  Description: Patient's Long Term Goal: go back home    Interventions:  - diuresis   - See additional Care Plan goals for specific interventions  Outcome: Progressing  Goal: Patient/Family Short Term Goal  Description: Patient's Short Term Goal: no pain in right leg    Interventions:   - manage pain appropriately  - See additional Care Plan goals for specific interventions  Outcome: Progressing     Problem: SAFETY ADULT - FALL  Goal: Free from fall injury  Description: INTERVENTIONS:  - Assess pt frequently for physical needs  - Identify cognitive and physical deficits and behaviors that affect risk of falls. - [place] fall precautions as indicated by assessment.  - Educate pt/family on patient safety including physical limitations  - Instruct pt to call for assistance with activity based on assessment  - Modify environment to reduce risk of injury  - Provide assistive devices as appropriate  - Consider OT/PT consult to assist with strengthening/mobility  - Encourage toileting schedule  Outcome: Progressing     Problem: CARDIOVASCULAR - ADULT  Goal: Maintains optimal cardiac output and hemodynamic stability  Description: INTERVENTIONS:  - Monitor vital signs, rhythm, and trends  - Monitor for bleeding, hypotension and signs of decreased cardiac output  - Evaluate effectiveness of vasoactive medications to optimize hemodynamic stability  - Monitor arterial and/or venous puncture sites for bleeding and/or hematoma  - Assess quality of pulses, skin color and temperature  - Assess for signs of decreased coronary artery perfusion - ex.  Angina  - Evaluate fluid balance, assess for edema, trend weights  Outcome: Progressing  Goal: Absence of cardiac arrhythmias or at baseline  Description: INTERVENTIONS:  - Continuous cardiac monitoring, monitor vital signs, obtain 12 lead EKG if indicated  - Evaluate effectiveness of antiarrhythmic and heart rate control medications as ordered  - Initiate emergency measures for life threatening arrhythmias  - Monitor electrolytes and administer replacement therapy as ordered  Outcome: Progressing     Problem: Patient/Family Goals  Goal: Patient/Family Long Term Goal  Description: Patient's Long Term Goal: go back home    Interventions:  - diuresis   - See additional Care Plan goals for specific interventions  Outcome: Progressing  Goal: Patient/Family Short Term Goal  Description: Patient's Short Term Goal: no pain in right leg    Interventions:   - manage pain appropriately  - See additional Care Plan goals for specific interventions  Outcome: Progressing     Problem: SAFETY ADULT - FALL  Goal: Free from fall injury  Description: INTERVENTIONS:  - Assess pt frequently for physical needs  - Identify cognitive and physical deficits and behaviors that affect risk of falls.   - [place] fall precautions as indicated by assessment.  - Educate pt/family on patient safety including physical limitations  - Instruct pt to call for assistance with activity based on assessment  - Modify environment to reduce risk of injury  - Provide assistive devices as appropriate  - Consider OT/PT consult to assist with strengthening/mobility  - Encourage toileting schedule  Outcome: Progressing

## 2022-06-11 NOTE — DIETARY NOTE
[facility]    Nutrition referral was triggered based on consult for HF diet education. Per chart review, pt is 78year old male with developmental delay that presents from his group home. Pt not appropriate for diet education at this time. Will follow up as appropriate for full nutrition assessed per policy. Please consult if patient status changes or nutrition issues arise.     Mendy Edge, RD, LDN, [address] [place]   Clinical Dietitian  Spectra: [phone]  Pager: [phone]

## 2022-06-11 NOTE — PLAN OF CARE
Alert and oriented x2 on tele monitor hr 70's sinus rhythm. Incontinent and kept clean and dry at all times. Denies any pain. All needs attended and will continue to monitor. Call light within reach. Problem: CARDIOVASCULAR - ADULT  Goal: Maintains optimal cardiac output and hemodynamic stability  Description: INTERVENTIONS:  - Monitor vital signs, rhythm, and trends  - Monitor for bleeding, hypotension and signs of decreased cardiac output  - Evaluate effectiveness of vasoactive medications to optimize hemodynamic stability  - Monitor arterial and/or venous puncture sites for bleeding and/or hematoma  - Assess quality of pulses, skin color and temperature  - Assess for signs of decreased coronary artery perfusion - ex. Angina  - Evaluate fluid balance, assess for edema, trend weights  Outcome: Progressing  Goal: Absence of cardiac arrhythmias or at baseline  Description: INTERVENTIONS:  - Continuous cardiac monitoring, monitor vital signs, obtain 12 lead EKG if indicated  - Evaluate effectiveness of antiarrhythmic and heart rate control medications as ordered  - Initiate emergency measures for life threatening arrhythmias  - Monitor electrolytes and administer replacement therapy as ordered  Outcome: Progressing     Problem: Patient/Family Goals  Goal: Patient/Family Long Term Goal  Description: Patient's Long Term Goal: go back home    Interventions:  - diuresis   - See additional Care Plan goals for specific interventions  Outcome: Progressing  Goal: Patient/Family Short Term Goal  Description: Patient's Short Term Goal: no pain in right leg    Interventions:   - manage pain appropriately  - See additional Care Plan goals for specific interventions  Outcome: Progressing     Problem: SAFETY ADULT - FALL  Goal: Free from fall injury  Description: INTERVENTIONS:  - Assess pt frequently for physical needs  - Identify cognitive and physical deficits and behaviors that affect risk of falls.   - [place] fall precautions as indicated by assessment.  - Educate pt/family on patient safety including physical limitations  - Instruct pt to call for assistance with activity based on assessment  - Modify environment to reduce risk of injury  - Provide assistive devices as appropriate  - Consider OT/PT consult to assist with strengthening/mobility  - Encourage toileting schedule  Outcome: Progressing

## 2022-06-12 VITALS
WEIGHT: 233.44 LBS | SYSTOLIC BLOOD PRESSURE: 119 MMHG | BODY MASS INDEX: 37.52 KG/M2 | DIASTOLIC BLOOD PRESSURE: 46 MMHG | RESPIRATION RATE: 16 BRPM | HEIGHT: 66 IN | TEMPERATURE: 98 F | HEART RATE: 89 BPM | OXYGEN SATURATION: 92 %

## 2022-06-12 PROCEDURE — 99217 OBSERVATION CARE DISCHARGE: CPT | Performed by: HOSPITALIST

## 2022-06-12 RX ORDER — FUROSEMIDE 20 MG/1
20 TABLET ORAL DAILY
Qty: 90 TABLET | Refills: 3 | Status: SHIPPED | OUTPATIENT
Start: 2022-06-13

## 2022-06-12 RX ORDER — LISINOPRIL 20 MG/1
20 TABLET ORAL DAILY
Qty: 90 TABLET | Refills: 3 | Status: SHIPPED | OUTPATIENT
Start: 2022-06-13

## 2022-06-12 NOTE — PLAN OF CARE
Call placed to [facility] for patient returning back to [facility]. Update on medications and information on patient was verbally given. She will notify [facility] of patients return today.

## 2022-06-12 NOTE — PLAN OF CARE
Problem: CARDIOVASCULAR - ADULT  Goal: Maintains optimal cardiac output and hemodynamic stability  Description: INTERVENTIONS:  - Monitor vital signs, rhythm, and trends  - Monitor for bleeding, hypotension and signs of decreased cardiac output  - Evaluate effectiveness of vasoactive medications to optimize hemodynamic stability  - Monitor arterial and/or venous puncture sites for bleeding and/or hematoma  - Assess quality of pulses, skin color and temperature  - Assess for signs of decreased coronary artery perfusion - ex. Angina  - Evaluate fluid balance, assess for edema, trend weights  Outcome: Progressing  Goal: Absence of cardiac arrhythmias or at baseline  Description: INTERVENTIONS:  - Continuous cardiac monitoring, monitor vital signs, obtain 12 lead EKG if indicated  - Evaluate effectiveness of antiarrhythmic and heart rate control medications as ordered  - Initiate emergency measures for life threatening arrhythmias  - Monitor electrolytes and administer replacement therapy as ordered  Outcome: Progressing     Problem: Patient/Family Goals  Goal: Patient/Family Long Term Goal  Description: Patient's Long Term Goal: go back home    Interventions:  - diuresis   - See additional Care Plan goals for specific interventions  Outcome: Progressing  Goal: Patient/Family Short Term Goal  Description: Patient's Short Term Goal: no pain in right leg    Interventions:   - manage pain appropriately  - See additional Care Plan goals for specific interventions  Outcome: Progressing     Problem: SAFETY ADULT - FALL  Goal: Free from fall injury  Description: INTERVENTIONS:  - Assess pt frequently for physical needs  - Identify cognitive and physical deficits and behaviors that affect risk of falls.   - [place] fall precautions as indicated by assessment.  - Educate pt/family on patient safety including physical limitations  - Instruct pt to call for assistance with activity based on assessment  - Modify environment to reduce risk of injury  - Provide assistive devices as appropriate  - Consider OT/PT consult to assist with strengthening/mobility  - Encourage toileting schedule  Outcome: Progressing     Problem: RESPIRATORY - ADULT  Goal: Achieves optimal ventilation and oxygenation  Description: INTERVENTIONS:  - Assess for changes in respiratory status  - Assess for changes in mentation and behavior  - Position to facilitate oxygenation and minimize respiratory effort  - Oxygen supplementation based on oxygen saturation or ABGs  - Provide Smoking Cessation handout, if applicable  - Encourage broncho-pulmonary hygiene including cough, deep breathe, Incentive Spirometry  - Assess the need for suctioning and perform as needed  - Assess and instruct to report SOB or any respiratory difficulty  - Respiratory Therapy support as indicated  - Manage/alleviate anxiety  - Monitor for signs/symptoms of CO2 retention  Outcome: Progressing     Problem: Impaired Functional Mobility  Goal: Achieve highest/safest level of mobility/gait  Description: Interventions:  - Assess patient's functional ability and stability  - Promote increasing activity/tolerance for mobility and gait  - Educate and engage patient/family in tolerated activity level and precautions    Outcome: Progressing

## 2022-06-12 NOTE — DISCHARGE PLANNING
NURSING DISCHARGE NOTE    Discharged Home via Ambulance. Accompanied by Support staff  Belongings Taken by patient/family. AVS printed and given to medics. Education completed and questions answered. IV and tele removed. Patient was discharged with no complaints.

## 2022-06-12 NOTE — PHYSICAL THERAPY NOTE
Orders received, chart reviewed. RN approved session, attempted to see patient for PT Evaluation, patient initially was agreeable to work with PT and ambulate but after this PT got the chair ready and asked patient to move B UE to assess ROM & strength, patient adamantly refused stating \"I don't want to do it, I want to watch TV\". Explained to patient that he can still watch TV and asked if he can get up in the chair, patient shooked head no stating \"I want to stay in bed\". Patient was told he can go back to bed after getting up to ambulate, patient at this time had his eyes closed adamantly refusing \"I don't want to do it\". RN made aware; RN reported patient will be discharging today.

## 2022-06-12 NOTE — PLAN OF CARE
RN SHIFT NOTE    Assumed care of pt at 0700. Pt complains of moderate pain in legs at this time. Was given tylenol to treat. Patient is alert and oriented x 2-3 (Reminders and reorientation completed). Patient is NSR on tele, S1 and S2 present and pt denies cardiac symptoms. BLE edema noted. +3 edema in the right leg and +2 edema in the left leg. Lung sounds clear and diminished. Abdomen soft and round. Last BM 6/11. Tolerating medications  and care needs have been met. POC: PT/OT to see, waiting on 2D echo results and cards to sign off        Problem: CARDIOVASCULAR - ADULT  Goal: Maintains optimal cardiac output and hemodynamic stability  Description: INTERVENTIONS:  - Monitor vital signs, rhythm, and trends  - Monitor for bleeding, hypotension and signs of decreased cardiac output  - Evaluate effectiveness of vasoactive medications to optimize hemodynamic stability  - Monitor arterial and/or venous puncture sites for bleeding and/or hematoma  - Assess quality of pulses, skin color and temperature  - Assess for signs of decreased coronary artery perfusion - ex.  Angina  - Evaluate fluid balance, assess for edema, trend weights  Outcome: Progressing  Goal: Absence of cardiac arrhythmias or at baseline  Description: INTERVENTIONS:  - Continuous cardiac monitoring, monitor vital signs, obtain 12 lead EKG if indicated  - Evaluate effectiveness of antiarrhythmic and heart rate control medications as ordered  - Initiate emergency measures for life threatening arrhythmias  - Monitor electrolytes and administer replacement therapy as ordered  Outcome: Progressing     Problem: Patient/Family Goals  Goal: Patient/Family Long Term Goal  Description: Patient's Long Term Goal: go back home    Interventions:  - diuresis   - See additional Care Plan goals for specific interventions  Outcome: Progressing  Goal: Patient/Family Short Term Goal  Description: Patient's Short Term Goal: no pain in right leg    Interventions: - manage pain appropriately  - See additional Care Plan goals for specific interventions  Outcome: Progressing     Problem: SAFETY ADULT - FALL  Goal: Free from fall injury  Description: INTERVENTIONS:  - Assess pt frequently for physical needs  - Identify cognitive and physical deficits and behaviors that affect risk of falls.   - [place] fall precautions as indicated by assessment.  - Educate pt/family on patient safety including physical limitations  - Instruct pt to call for assistance with activity based on assessment  - Modify environment to reduce risk of injury  - Provide assistive devices as appropriate  - Consider OT/PT consult to assist with strengthening/mobility  - Encourage toileting schedule  Outcome: Progressing     Problem: RESPIRATORY - ADULT  Goal: Achieves optimal ventilation and oxygenation  Description: INTERVENTIONS:  - Assess for changes in respiratory status  - Assess for changes in mentation and behavior  - Position to facilitate oxygenation and minimize respiratory effort  - Oxygen supplementation based on oxygen saturation or ABGs  - Provide Smoking Cessation handout, if applicable  - Encourage broncho-pulmonary hygiene including cough, deep breathe, Incentive Spirometry  - Assess the need for suctioning and perform as needed  - Assess and instruct to report SOB or any respiratory difficulty  - Respiratory Therapy support as indicated  - Manage/alleviate anxiety  - Monitor for signs/symptoms of CO2 retention  Outcome: Progressing     Problem: Impaired Functional Mobility  Goal: Achieve highest/safest level of mobility/gait  Description: Interventions:  - Assess patient's functional ability and stability  - Promote increasing activity/tolerance for mobility and gait  Outcome: Progressing

## 2022-06-29 DIAGNOSIS — I82.419 ACUTE DEEP VEIN THROMBOSIS (DVT) OF FEMORAL VEIN, UNSPECIFIED LATERALITY (HCC): ICD-10-CM

## 2022-06-29 RX ORDER — RIVAROXABAN 20 MG/1
TABLET, FILM COATED ORAL
Qty: 30 TABLET | Refills: 1 | Status: SHIPPED | OUTPATIENT
Start: 2022-06-29 | End: 2022-08-24

## 2022-08-24 DIAGNOSIS — I82.419 ACUTE DEEP VEIN THROMBOSIS (DVT) OF FEMORAL VEIN, UNSPECIFIED LATERALITY (HCC): ICD-10-CM

## 2022-08-24 RX ORDER — RIVAROXABAN 20 MG/1
TABLET, FILM COATED ORAL
Qty: 30 TABLET | Refills: 1 | Status: SHIPPED | OUTPATIENT
Start: 2022-08-24

## 2022-11-23 ENCOUNTER — TELEPHONE (OUTPATIENT)
Dept: HEMATOLOGY/ONCOLOGY | Facility: HOSPITAL | Age: 67
End: 2022-11-23

## 2022-11-23 DIAGNOSIS — I82.419 ACUTE DEEP VEIN THROMBOSIS (DVT) OF FEMORAL VEIN, UNSPECIFIED LATERALITY (HCC): ICD-10-CM

## 2022-11-23 RX ORDER — RIVAROXABAN 20 MG/1
TABLET, FILM COATED ORAL
Qty: 30 TABLET | Refills: 1 | Status: SHIPPED | OUTPATIENT
Start: 2022-11-23

## 2023-02-01 DIAGNOSIS — I82.419 ACUTE DEEP VEIN THROMBOSIS (DVT) OF FEMORAL VEIN, UNSPECIFIED LATERALITY (HCC): ICD-10-CM

## 2023-02-02 RX ORDER — RIVAROXABAN 20 MG/1
TABLET, FILM COATED ORAL
Qty: 30 TABLET | Refills: 1 | Status: SHIPPED | OUTPATIENT
Start: 2023-02-02

## 2023-03-30 DIAGNOSIS — I82.419 ACUTE DEEP VEIN THROMBOSIS (DVT) OF FEMORAL VEIN, UNSPECIFIED LATERALITY (HCC): ICD-10-CM

## 2023-03-31 ENCOUNTER — TELEPHONE (OUTPATIENT)
Dept: HEMATOLOGY/ONCOLOGY | Facility: HOSPITAL | Age: 68
End: 2023-03-31

## 2023-03-31 NOTE — TELEPHONE ENCOUNTER
[facility]U with Dr. Dalton Robin for his annual yearly appointment, in order to continue his Xarelto per Enrique Gomez.  Called 3/31/23

## 2023-04-03 ENCOUNTER — TELEPHONE (OUTPATIENT)
Dept: HEMATOLOGY/ONCOLOGY | Age: 68
End: 2023-04-03

## 2023-04-03 NOTE — TELEPHONE ENCOUNTER
I spoke to a lady named Izabel Babcock at [phone] and she stated that Daron Osborn is the person that makes this patient's Dr's. appointment she from [facility]. Called 4/3/23. Need to schedule a F/U appointment with Dr. Clarence Cleaning in order to continue his Xarelto medication. Called 4/3/23.

## 2023-04-28 ENCOUNTER — APPOINTMENT (OUTPATIENT)
Dept: HEMATOLOGY/ONCOLOGY | Age: 68
End: 2023-04-28
Attending: INTERNAL MEDICINE
Payer: MEDICARE

## 2023-06-02 ENCOUNTER — APPOINTMENT (OUTPATIENT)
Dept: HEMATOLOGY/ONCOLOGY | Age: 68
End: 2023-06-02
Attending: INTERNAL MEDICINE
Payer: MEDICARE

## 2023-06-08 DIAGNOSIS — I82.419 ACUTE DEEP VEIN THROMBOSIS (DVT) OF FEMORAL VEIN, UNSPECIFIED LATERALITY (HCC): ICD-10-CM

## 2023-06-08 RX ORDER — RIVAROXABAN 20 MG/1
TABLET, FILM COATED ORAL
Qty: 30 TABLET | Refills: 0 | Status: SHIPPED | OUTPATIENT
Start: 2023-06-08

## 2023-06-23 ENCOUNTER — APPOINTMENT (OUTPATIENT)
Dept: HEMATOLOGY/ONCOLOGY | Age: 68
End: 2023-06-23
Payer: MEDICARE

## 2023-07-07 ENCOUNTER — OFFICE VISIT (OUTPATIENT)
Dept: HEMATOLOGY/ONCOLOGY | Age: 68
End: 2023-07-07
Attending: INTERNAL MEDICINE
Payer: MEDICARE

## 2023-07-07 VITALS
SYSTOLIC BLOOD PRESSURE: 115 MMHG | TEMPERATURE: 97 F | BODY MASS INDEX: 40 KG/M2 | RESPIRATION RATE: 20 BRPM | WEIGHT: 247 LBS | DIASTOLIC BLOOD PRESSURE: 73 MMHG | OXYGEN SATURATION: 96 % | HEART RATE: 111 BPM

## 2023-07-07 DIAGNOSIS — I82.419 ACUTE DEEP VEIN THROMBOSIS (DVT) OF FEMORAL VEIN, UNSPECIFIED LATERALITY (HCC): Primary | ICD-10-CM

## 2023-07-07 PROCEDURE — 99213 OFFICE O/P EST LOW 20 MIN: CPT | Performed by: INTERNAL MEDICINE

## 2023-07-07 RX ORDER — AMLODIPINE BESYLATE 5 MG/1
5 TABLET ORAL
COMMUNITY
Start: 2022-07-25

## 2023-07-07 NOTE — PROGRESS NOTES
Patient is here for follow up for DVT on xarelto. He says that he is feeling fine. He does say that he has some pain in his legs every day. There is no new or severe pain. He is in a wheelchair with caregivers from the facility where he lives. He has some chronic swelling and is on furosemide.       Education Record    Learner:  Patient and Other:  caregiver from the facility where he lives    Disease / Diagnosis: DVT on xarelto    Barriers / Limitations:  Cognitive limitations   Comments:    Method:  Brief focused   Comments:    General Topics:  Side effects and symptom management   Comments:    Outcome:  Unable to comprehend   Comments:

## 2023-07-14 ENCOUNTER — HOSPITAL ENCOUNTER (EMERGENCY)
Age: 68
Discharge: STILL ADMITTED OR ADMINISTRATIVE | End: 2023-07-14
Payer: MEDICARE

## 2023-07-14 ENCOUNTER — HOSPITAL ENCOUNTER (EMERGENCY)
Age: 68
Discharge: HOME | End: 2023-07-14
Attending: EMERGENCY MEDICINE
Payer: MEDICARE

## 2023-07-14 VITALS
WEIGHT: 245 LBS | TEMPERATURE: 98 F | DIASTOLIC BLOOD PRESSURE: 69 MMHG | RESPIRATION RATE: 18 BRPM | HEART RATE: 96 BPM | BODY MASS INDEX: 40 KG/M2 | SYSTOLIC BLOOD PRESSURE: 125 MMHG | OXYGEN SATURATION: 95 %

## 2023-07-14 DIAGNOSIS — L03.115 CELLULITIS OF RIGHT LEG: Primary | ICD-10-CM

## 2023-07-14 LAB
ANION GAP SERPL CALC-SCNC: 3 MMOL/L (ref 0–18)
BASOPHILS # BLD AUTO: 0.05 X10(3) UL (ref 0–0.2)
BASOPHILS NFR BLD AUTO: 0.6 %
BUN BLD-MCNC: 12 MG/DL (ref 7–18)
CALCIUM BLD-MCNC: 8.4 MG/DL (ref 8.5–10.1)
CHLORIDE SERPL-SCNC: 108 MMOL/L (ref 98–112)
CO2 SERPL-SCNC: 28 MMOL/L (ref 21–32)
CREAT BLD-MCNC: 0.92 MG/DL
EOSINOPHIL # BLD AUTO: 0.14 X10(3) UL (ref 0–0.7)
EOSINOPHIL NFR BLD AUTO: 1.8 %
ERYTHROCYTE [DISTWIDTH] IN BLOOD BY AUTOMATED COUNT: 14.6 %
GFR SERPLBLD BASED ON 1.73 SQ M-ARVRAT: 90 ML/MIN/1.73M2 (ref 60–?)
GLUCOSE BLD-MCNC: 89 MG/DL (ref 70–99)
HCT VFR BLD AUTO: 33.2 %
HGB BLD-MCNC: 10.3 G/DL
IMM GRANULOCYTES # BLD AUTO: 0.03 X10(3) UL (ref 0–1)
IMM GRANULOCYTES NFR BLD: 0.4 %
LYMPHOCYTES # BLD AUTO: 1.96 X10(3) UL (ref 1–4)
LYMPHOCYTES NFR BLD AUTO: 24.6 %
MCH RBC QN AUTO: 26.4 PG (ref 26–34)
MCHC RBC AUTO-ENTMCNC: 31 G/DL (ref 31–37)
MCV RBC AUTO: 85.1 FL
MONOCYTES # BLD AUTO: 0.63 X10(3) UL (ref 0.1–1)
MONOCYTES NFR BLD AUTO: 7.9 %
NEUTROPHILS # BLD AUTO: 5.16 X10 (3) UL (ref 1.5–7.7)
NEUTROPHILS # BLD AUTO: 5.16 X10(3) UL (ref 1.5–7.7)
NEUTROPHILS NFR BLD AUTO: 64.7 %
OSMOLALITY SERPL CALC.SUM OF ELEC: 287 MOSM/KG (ref 275–295)
PLATELET # BLD AUTO: 247 10(3)UL (ref 150–450)
POTASSIUM SERPL-SCNC: 3.8 MMOL/L (ref 3.5–5.1)
RBC # BLD AUTO: 3.9 X10(6)UL
SODIUM SERPL-SCNC: 139 MMOL/L (ref 136–145)
WBC # BLD AUTO: 8 X10(3) UL (ref 4–11)

## 2023-07-14 PROCEDURE — 80048 BASIC METABOLIC PNL TOTAL CA: CPT | Performed by: PHYSICIAN ASSISTANT

## 2023-07-14 PROCEDURE — 99284 EMERGENCY DEPT VISIT MOD MDM: CPT

## 2023-07-14 PROCEDURE — 85025 COMPLETE CBC W/AUTO DIFF WBC: CPT | Performed by: PHYSICIAN ASSISTANT

## 2023-07-14 PROCEDURE — 99283 EMERGENCY DEPT VISIT LOW MDM: CPT

## 2023-07-14 PROCEDURE — 36415 COLL VENOUS BLD VENIPUNCTURE: CPT

## 2023-07-14 RX ORDER — CEPHALEXIN 500 MG/1
500 CAPSULE ORAL 4 TIMES DAILY
Qty: 40 CAPSULE | Refills: 0 | Status: SHIPPED | OUTPATIENT
Start: 2023-07-14 | End: 2023-07-24

## 2023-07-14 NOTE — DISCHARGE INSTRUCTIONS
Continue to use dove/dial soap  Continue to use the mupirocin ointment twice a day  Take keflex 4 times a day until gone  Follow upw ith primary care doctor in 24-48 hours  Return to the ER if symptoms worsen

## 2023-08-04 DIAGNOSIS — I82.419 ACUTE DEEP VEIN THROMBOSIS (DVT) OF FEMORAL VEIN, UNSPECIFIED LATERALITY (HCC): ICD-10-CM

## 2023-08-04 RX ORDER — RIVAROXABAN 20 MG/1
TABLET, FILM COATED ORAL
Qty: 30 TABLET | Refills: 3 | Status: SHIPPED | OUTPATIENT
Start: 2023-08-04

## (undated) DIAGNOSIS — I82.419 ACUTE DEEP VEIN THROMBOSIS (DVT) OF FEMORAL VEIN, UNSPECIFIED LATERALITY (HCC): Primary | ICD-10-CM

## (undated) NOTE — IP AVS SNAPSHOT
[address]            (For Outpatient Use Only) Initial Admit Date: 6/10/2022   Inpt/Obs Admit Date: Inpt: N/A / Obs: 06/10/22   Discharge Date:    Hospital Acct:  [de-identified]   MRN: [de-identified]   CSN: [id number]   CEID: [id number]        ENCOUNTER  Patient Class: Observation Admitting Provider: Maria R Alexander DO Unit: [address] [place] [address] Service: Cardiac Telemetry Attending Provider: Jake Angel MD   [facility]   Visit Type:   Referring Physician: No ref. provider found Billing Flag:    Admit Diagnosis: Dyspnea, unspecified type [R06.00]      PATIENT  Legal Name:   Camilo Childers    Legal Sex: Male  Gender ID:              Pref Name:    PCP:  Flory Langston: [phone]   Address:  [address] [place] [address] : 1955 (66 yrs) Mobile: [phone]         City/State/Zip: [place] [address] Marital: Single Language: Bibi Saunders: Will SSN4: [id number] Taoist: No Taoist Indicated/Gi*     Race: White Ethnicity: Non  Or  O*   EMERGENCY CONTACT   Name Relationship Legal Guardian? Home Phone Work Phone Mobile Phone   1. Carrington Vázquez  2.  Ciro López RN Guardian  OTHER Yes  No    [phone]        [facility] 1955 Home Phone: [phone]   Address: [address] [place] [address]  Sex: Male Work Phone:    City/State/Zip: [address]   Rel. to Patient: Self Guarantor ID: [id number]   [address]   Employer:  Status: RETIRED     COVERAGE  PRIMARY INSURANCE   Payor: MEDICARE Plan: MEDICARE PART A&B   Group Number:  Insurance Type: INDEMNITY   Subscriber Name: Yeyo Palmer : 1955   Subscriber ID: [id number] Pt Rel to Subscriber: Self   SECONDARY INSURANCE   Payor: MEDICAID Plan: MEDICAID   Group Number: [id number] Insurance Type: [id number] Name: Yeyo Palmer : 1955   Subscriber ID: [id number] Pt Rel to Subscriber: SELF   TERTIARY INSURANCE   Payor:  Plan:    Group Number:  Insurance Type:    Subscriber Name:  Subscriber :    Subscriber ID:  Pt Rel to Subscriber:    Hospital Account Financial Class: Medicare    2022

## (undated) NOTE — IP AVS SNAPSHOT
Patient Demographics     [facility]  [address] [facility] [address] Phone  [phone] [facility]  [phone] [facility]      Emergency Contact(s)     Name Relation Home Work [address] [facility] daily. fluticasone propionate 50 MCG/ACT Susp  Commonly known as: FLONASE      1 spray by Each Nare route daily. GAVILAX OR      Take by mouth daily.           lisinopril-hydroCHLOROthiazide 10-12.5 MG Tabs      Take 10-12.5 mg by mouth da your prescriptions at the location directed by your doctor or nurse    Bring a paper prescription for each of these medications  rivaroxaban 15 & 20 MG Tbpk           [id number] - MAR ACTION REPORT  (last 24 hrs)    ** SITE UNKNOWN **     Order ID Medicat Results Last 24 Hours      PTT, Activated [[id number]] (Abnormal)  Resulted: 12/17/21 1027, Result status: Final result   Ordering provider: Casper Nolasco MD  12/16/21 2300 Resulting lab: [address] [facility])    Specimen Information Service: — Author Type: Physician    Filed: 12/15/2021  5:28 AM Date of Service: 12/14/2021 10:31 PM Status: Signed    : Jeremy Hubbard DO (Physician)         [facility]  History and Physical     [facility] Patient Status:  Emergency Take 0.5 mg by mouth 2 (two) times daily. , Disp: , Rfl:   Fluticasone Propionate 50 MCG/ACT Nasal Suspension, 1 spray by Each Nare route daily. , Disp: , Rfl:   loratadine 10 MG Oral Tab, Take 10 mg by mouth daily. , Disp: , Rfl:   Sertraline HCl 50 MG Oral 3.  HEENT: Normocephalic atraumatic. Moist mucous membranes. EOM-I. PERRLA. Anicteric. Neck: No lymphadenopathy. No JVD. No carotid bruits. Respiratory: Clear to auscultation bilaterally. No wheezes. No rhonchi.   Cardiovascular: S1, S2. Regular rate and statin    #Psychiatric disorder  - risperidone, sertraline, benztropine    #Seizure disorder  - not on AED's    #Hyperglycemia, no hx of DM  - HgbA1c ordered    #Normocytic anemia - unclear chronicity    #BPH  - tamsulosin    Quality:  · DVT Prophylaxis: l • Stroke ([facility] [address]     Cpap use   • Urinary incontinence        Past Surgical History:  History reviewed. No pertinent surgical history.     Family Medical History:  Family History   Family history unknown: Yes       Psychosocial History:  Social History Anicteric sclera. Pink conjunctiva. Respiratory: Clear to auscultation and percussion. No rales. No wheezes. Cardiovascular: Regular rate and rhythm. Gastrointestinal: Soft, non tender with good bowel sounds.   Extremities: Bilateral lower extremity ed UFH. He is a candidate for treatment with a DOAC. He has normal renal function. I favor treating with rivaroxaban 15 mg BID x 21 days followed by 20 mg daily thereafter. I think he will need long term maintenance.  We could consider decreased dosing after s straw  Medication Administration Recommendations: Whole in puree;Crushed in puree  Treatment Plan/Recommendations: Dysphagia therapy; Aspiration precautions    HISTORY   Background/Objective Information:    Problem List  Principal Problem:     Folliculitis Impaired  Mastication (VFSS - Thin Liquids):  (n/a)  Retention (VFSS - Thin Liquids): Impaired  Triggered at: Valleculae;Aryepiglottic folds  Premature Spillage to: Aryepiglottic folds; Pyriform sinuses; Valleculae  Delay (seconds): 1-2 seconds  Residue Gretchen Soft Solid): Impaired  Retention (VFSS - Soft Solid):  Intact  Triggered at: Valleculae  Delay (seconds): none  Residue Severity, Location: Mild;Valleculae;Pyriform sinuses  Cleared/Reduced with: Secondary swallow  Laryngeal Penetration: None  Tracheal Aspi or symptoms of aspiration with 95 % accuracy over 1 session(s). In Progress   Goal #2 The patient/family/caregiver will demonstrate understanding and implementation of aspiration precautions and swallow strategies independently over 1 session(s).     In Pr goals for specific interventions   Patient/Family Short Term Goal     Interdisciplinary Progressing    Description: Patient's Short Term Goal: Treatment of Blood Clot    Interventions:   - Heparin gtt per protocol  -Transition to oral anticoagulation  - Se

## (undated) NOTE — IP AVS SNAPSHOT
[address]            (For Outpatient Use Only) Initial Admit Date: 12/14/2021   Inpt/Obs Admit Date: Inpt: N/A / Obs: 12/15/21   Discharge Date:    Mckenzie Tang:  [de-identified]   MRN: [de-identified]   CSN: [id number]   CEID: [id number] Subscriber Name:  Whit Padron :    Subscriber ID:  Pt Rel to Subscriber:    Hospital Account Financial Class: Medicare    2021